# Patient Record
Sex: FEMALE | URBAN - METROPOLITAN AREA
[De-identification: names, ages, dates, MRNs, and addresses within clinical notes are randomized per-mention and may not be internally consistent; named-entity substitution may affect disease eponyms.]

---

## 2021-06-01 ENCOUNTER — APPOINTMENT (RX ONLY)
Dept: URBAN - METROPOLITAN AREA CLINIC 73 | Facility: CLINIC | Age: 41
Setting detail: DERMATOLOGY
End: 2021-06-01

## 2021-06-01 DIAGNOSIS — L72.0 EPIDERMAL CYST: ICD-10-CM | Status: INADEQUATELY CONTROLLED

## 2021-06-01 PROCEDURE — 99202 OFFICE O/P NEW SF 15 MIN: CPT | Mod: 25

## 2021-06-01 PROCEDURE — ? ORDER TESTS

## 2021-06-01 PROCEDURE — ? COUNSELING

## 2021-06-01 PROCEDURE — 11900 INJECT SKIN LESIONS </W 7: CPT

## 2021-06-01 PROCEDURE — ? SEPARATE AND IDENTIFIABLE DOCUMENTATION

## 2021-06-01 PROCEDURE — ? INTRALESIONAL KENALOG

## 2021-06-01 PROCEDURE — 10060 I&D ABSCESS SIMPLE/SINGLE: CPT

## 2021-06-01 PROCEDURE — ? INCISION AND DRAINAGE

## 2021-06-01 ASSESSMENT — LOCATION ZONE DERM: LOCATION ZONE: EAR

## 2021-06-01 ASSESSMENT — LOCATION SIMPLE DESCRIPTION DERM: LOCATION SIMPLE: RIGHT EAR

## 2021-06-01 ASSESSMENT — LOCATION DETAILED DESCRIPTION DERM: LOCATION DETAILED: RIGHT POSTERIOR EAR

## 2021-06-01 NOTE — PROCEDURE: INTRALESIONAL KENALOG
Validate Note Data When Using Inventory: Yes
Detail Level: Detailed
Treatment Number (Optional): 1
Administered By (Optional): jose benavides
Medical Necessity Clause: This procedure was medically necessary because the lesions that were treated were:\\na/s did not help/not successful
X Size Of Lesion In Cm (Optional): 0
Total Volume Injected (Ccs- Only Use Numbers And Decimals): 0.25
Kenalog Preparation: Kenalog
Consent: The risks of atrophy were reviewed with the patient.
Concentration Of Solution Injected (Mg/Ml): 10.0
Include Z78.9 (Other Specified Conditions Influencing Health Status) As An Associated Diagnosis?: No

## 2021-06-01 NOTE — PROCEDURE: ORDER TESTS
Performing Laboratory: 0
Expected Date Of Service: 06/01/2021
Billing Type: Third-Party Bill
Bill For Surgical Tray: no

## 2021-06-01 NOTE — PROCEDURE: INCISION AND DRAINAGE
Detail Level: Detailed
Lesion Type: Cyst
Method: 11 blade
Curette: Yes
Include Sutures?: No
Anesthesia Type: 1% lidocaine with epinephrine
Anesthesia Volume In Cc: 0.6
Packing?: iodoform packing strips
Size Of Lesion In Cm (Optional But May Be Required For Some Insurances): 0
Drainage Amount?: moderate
Drainage Type?: bloody and cyst-like
Wound Care: Vaseline
Dressing: pressure dressing
Epidermal Sutures: 4-0 Ethilon
Epidermal Closure: simple interrupted
Suture Text: The incision was partially closed with
Preparation Text: The area was prepped in the usual clean fashion.
Curette Text (Optional): After the contents were expressed a curette was used to partially remove the cyst wall.
Consent was obtained and risks were reviewed including but not limited to delayed wound healing, infection, need for multiple I and D's, and pain.
Post-Care Instructions: I reviewed with the patient in detail post-care instructions. Patient should keep wound covered and call the office should any redness, pain, swelling or worsening occur.

## 2021-06-01 NOTE — HPI: SKIN LESION
Is This A New Presentation, Or A Follow-Up?: Skin Lesion
How Severe Is Your Skin Lesion?: moderate
Additional History: Patient tried applying tea tree oil once. Patient also tried hot water.

## 2021-06-01 NOTE — PROCEDURE: MIPS QUALITY
Quality 110: Preventive Care And Screening: Influenza Immunization: Influenza Immunization not Administered for Documented Reasons.
Additional Notes: Patient declined
Quality 130: Documentation Of Current Medications In The Medical Record: Current Medications Documented
Detail Level: Detailed

## 2021-06-15 ENCOUNTER — APPOINTMENT (RX ONLY)
Dept: URBAN - METROPOLITAN AREA CLINIC 73 | Facility: CLINIC | Age: 41
Setting detail: DERMATOLOGY
End: 2021-06-15

## 2021-06-15 DIAGNOSIS — L72.0 EPIDERMAL CYST: ICD-10-CM | Status: RESOLVING

## 2021-06-15 PROCEDURE — ? COUNSELING

## 2021-06-15 PROCEDURE — 99212 OFFICE O/P EST SF 10 MIN: CPT

## 2021-06-15 ASSESSMENT — LOCATION DETAILED DESCRIPTION DERM: LOCATION DETAILED: RIGHT POSTERIOR EAR

## 2021-06-15 ASSESSMENT — LOCATION SIMPLE DESCRIPTION DERM: LOCATION SIMPLE: RIGHT EAR

## 2021-06-15 ASSESSMENT — LOCATION ZONE DERM: LOCATION ZONE: EAR

## 2021-06-15 NOTE — PROCEDURE: COUNSELING
Detail Level: Detailed
Patient Specific Counseling (Will Not Stick From Patient To Patient): - healing well s/p I&D; cx neg 6/1/21\\n- will monitor\\n- rtc prn

## 2022-02-15 ENCOUNTER — APPOINTMENT (RX ONLY)
Dept: URBAN - METROPOLITAN AREA CLINIC 73 | Facility: CLINIC | Age: 42
Setting detail: DERMATOLOGY
End: 2022-02-15

## 2022-02-15 DIAGNOSIS — L30.9 DERMATITIS, UNSPECIFIED: ICD-10-CM | Status: WORSENING

## 2022-02-15 PROCEDURE — ? ORDER TESTS

## 2022-02-15 PROCEDURE — 99213 OFFICE O/P EST LOW 20 MIN: CPT

## 2022-02-15 PROCEDURE — ? PRESCRIPTION

## 2022-02-15 PROCEDURE — ? PATIENT SPECIFIC COUNSELING

## 2022-02-15 PROCEDURE — ? TREATMENT REGIMEN

## 2022-02-15 PROCEDURE — ? COUNSELING

## 2022-02-15 RX ORDER — MUPIROCIN 20 MG/G
OINTMENT TOPICAL
Qty: 22 | Refills: 1 | Status: ERX | COMMUNITY
Start: 2022-02-15

## 2022-02-15 RX ORDER — FLUOCINOLONE ACETONIDE 0.25 MG/G
OINTMENT TOPICAL
Qty: 15 | Refills: 0 | Status: ERX | COMMUNITY
Start: 2022-02-15

## 2022-02-15 RX ADMIN — MUPIROCIN: 20 OINTMENT TOPICAL at 00:00

## 2022-02-15 RX ADMIN — FLUOCINOLONE ACETONIDE: 0.25 OINTMENT TOPICAL at 00:00

## 2022-02-15 ASSESSMENT — LOCATION ZONE DERM: LOCATION ZONE: FACE

## 2022-02-15 ASSESSMENT — LOCATION SIMPLE DESCRIPTION DERM: LOCATION SIMPLE: RIGHT CHEEK

## 2022-02-15 ASSESSMENT — LOCATION DETAILED DESCRIPTION DERM: LOCATION DETAILED: RIGHT CENTRAL BUCCAL CHEEK

## 2022-02-15 NOTE — PROCEDURE: TREATMENT REGIMEN
Initiate Treatment: - mix Mupirocin ointment and fluocinolone 0.025% cream and apply to affected area on face BID x10d
Detail Level: Zone
Otc Regimen: - Cetaphil moisturizer and cleanser
Plan: - will call w culture results

## 2022-02-15 NOTE — PROCEDURE: MIPS QUALITY
Additional Notes: Declined influenza, COVID vaccination.
Quality 110: Preventive Care And Screening: Influenza Immunization: Influenza immunization was not ordered or administered, reason not given
Detail Level: Detailed
Quality 130: Documentation Of Current Medications In The Medical Record: Current Medications Documented

## 2022-02-15 NOTE — PROCEDURE: ORDER TESTS
Performing Laboratory: 0
Billing Type: Third-Party Bill
Bill For Surgical Tray: no
Expected Date Of Service: 02/15/2022

## 2022-02-15 NOTE — PROCEDURE: PATIENT SPECIFIC COUNSELING
Detail Level: Zone
- s/p facial waxing 2 days ago\\n- now w painful red, discolored rash on face\\n- honey colored crust on exam \\n- denies recent retinA weeks\\n- has been using avloe Vera cream daily

## 2022-02-15 NOTE — HPI: SKIN LESION
Is This A New Presentation, Or A Follow-Up?: Skin Lesion
How Severe Is Your Skin Lesion?: moderate
Has Your Skin Lesion Been Treated?: not been treated
Additional History: Pt reports burn on face after facial waxing 2 days ago. Reports redness, pain. Using aloe Vera gel 3-4x / day.

## 2022-03-08 ENCOUNTER — APPOINTMENT (RX ONLY)
Dept: URBAN - METROPOLITAN AREA CLINIC 73 | Facility: CLINIC | Age: 42
Setting detail: DERMATOLOGY
End: 2022-03-08

## 2022-03-08 DIAGNOSIS — L30.9 DERMATITIS, UNSPECIFIED: ICD-10-CM | Status: RESOLVED

## 2022-03-08 PROCEDURE — ? PATIENT SPECIFIC COUNSELING

## 2022-03-08 PROCEDURE — ? TREATMENT REGIMEN

## 2022-03-08 PROCEDURE — ? COUNSELING

## 2022-03-08 PROCEDURE — ? PRESCRIPTION

## 2022-03-08 PROCEDURE — 99213 OFFICE O/P EST LOW 20 MIN: CPT

## 2022-03-08 RX ORDER — HYDROQUINONE 8% 8 G/100G
EMULSION TOPICAL
Qty: 30 | Refills: 0 | Status: ERX

## 2022-03-08 ASSESSMENT — LOCATION DETAILED DESCRIPTION DERM: LOCATION DETAILED: RIGHT INFERIOR LATERAL BUCCAL CHEEK

## 2022-03-08 ASSESSMENT — LOCATION ZONE DERM: LOCATION ZONE: FACE

## 2022-03-08 ASSESSMENT — LOCATION SIMPLE DESCRIPTION DERM: LOCATION SIMPLE: RIGHT CHEEK

## 2022-03-08 NOTE — PROCEDURE: MIPS QUALITY
Quality 110: Preventive Care And Screening: Influenza Immunization: Influenza Immunization not Administered because Patient Refused.
Additional Notes: Pt refused vaccinations for Covid-19.
Detail Level: Detailed
Quality 130: Documentation Of Current Medications In The Medical Record: Current Medications Documented

## 2022-03-08 NOTE — PROCEDURE: PATIENT SPECIFIC COUNSELING
- counseled using bleaching creams for areas of hyperpigmentation \\n- counseled spots will fade on their own if pt avoids going in the sun\\n- pt says frequently stays in the sun, but wears a hat that shades affected area\\n- provided options of otc 2% hydroquinone or rx 8% hydroquinone\\n- counseled pt rx is more effective and would take 8-10 weeks to resolve discoloration \\n- pt wants rx\\n- counseled pt must wear sunscreen on bleaching creams
Detail Level: Zone
